# Patient Record
Sex: MALE | Race: WHITE | NOT HISPANIC OR LATINO | URBAN - METROPOLITAN AREA
[De-identification: names, ages, dates, MRNs, and addresses within clinical notes are randomized per-mention and may not be internally consistent; named-entity substitution may affect disease eponyms.]

---

## 2019-02-25 ENCOUNTER — OUTPATIENT (OUTPATIENT)
Dept: OUTPATIENT SERVICES | Facility: HOSPITAL | Age: 69
LOS: 1 days | End: 2019-02-25
Payer: MEDICARE

## 2019-02-25 VITALS
HEART RATE: 105 BPM | RESPIRATION RATE: 16 BRPM | TEMPERATURE: 98 F | OXYGEN SATURATION: 95 % | SYSTOLIC BLOOD PRESSURE: 145 MMHG | HEIGHT: 68 IN | WEIGHT: 227.08 LBS | DIASTOLIC BLOOD PRESSURE: 83 MMHG

## 2019-02-25 DIAGNOSIS — H26.9 UNSPECIFIED CATARACT: Chronic | ICD-10-CM

## 2019-02-25 DIAGNOSIS — S82.002A UNSPECIFIED FRACTURE OF LEFT PATELLA, INITIAL ENCOUNTER FOR CLOSED FRACTURE: ICD-10-CM

## 2019-02-25 DIAGNOSIS — Z01.818 ENCOUNTER FOR OTHER PREPROCEDURAL EXAMINATION: ICD-10-CM

## 2019-02-25 DIAGNOSIS — I10 ESSENTIAL (PRIMARY) HYPERTENSION: ICD-10-CM

## 2019-02-25 DIAGNOSIS — Z90.49 ACQUIRED ABSENCE OF OTHER SPECIFIED PARTS OF DIGESTIVE TRACT: Chronic | ICD-10-CM

## 2019-02-25 DIAGNOSIS — Z98.890 OTHER SPECIFIED POSTPROCEDURAL STATES: Chronic | ICD-10-CM

## 2019-02-25 DIAGNOSIS — Z96.649 PRESENCE OF UNSPECIFIED ARTIFICIAL HIP JOINT: Chronic | ICD-10-CM

## 2019-02-25 DIAGNOSIS — F41.9 ANXIETY DISORDER, UNSPECIFIED: ICD-10-CM

## 2019-02-25 LAB
ALBUMIN SERPL ELPH-MCNC: 3.9 G/DL — SIGNIFICANT CHANGE UP (ref 3.3–5)
ALP SERPL-CCNC: 103 U/L — SIGNIFICANT CHANGE UP (ref 40–120)
ALT FLD-CCNC: 38 U/L — SIGNIFICANT CHANGE UP (ref 12–78)
ANION GAP SERPL CALC-SCNC: 7 MMOL/L — SIGNIFICANT CHANGE UP (ref 5–17)
AST SERPL-CCNC: 19 U/L — SIGNIFICANT CHANGE UP (ref 15–37)
BILIRUB SERPL-MCNC: 0.5 MG/DL — SIGNIFICANT CHANGE UP (ref 0.2–1.2)
BUN SERPL-MCNC: 18 MG/DL — SIGNIFICANT CHANGE UP (ref 7–23)
CALCIUM SERPL-MCNC: 8.8 MG/DL — SIGNIFICANT CHANGE UP (ref 8.5–10.1)
CHLORIDE SERPL-SCNC: 104 MMOL/L — SIGNIFICANT CHANGE UP (ref 96–108)
CO2 SERPL-SCNC: 31 MMOL/L — SIGNIFICANT CHANGE UP (ref 22–31)
CREAT SERPL-MCNC: 0.91 MG/DL — SIGNIFICANT CHANGE UP (ref 0.5–1.3)
GLUCOSE SERPL-MCNC: 101 MG/DL — HIGH (ref 70–99)
HCT VFR BLD CALC: 41.9 % — SIGNIFICANT CHANGE UP (ref 39–50)
HGB BLD-MCNC: 13.3 G/DL — SIGNIFICANT CHANGE UP (ref 13–17)
MCHC RBC-ENTMCNC: 28.1 PG — SIGNIFICANT CHANGE UP (ref 27–34)
MCHC RBC-ENTMCNC: 31.7 GM/DL — LOW (ref 32–36)
MCV RBC AUTO: 88.4 FL — SIGNIFICANT CHANGE UP (ref 80–100)
NRBC # BLD: 0 /100 WBCS — SIGNIFICANT CHANGE UP (ref 0–0)
PLATELET # BLD AUTO: 179 K/UL — SIGNIFICANT CHANGE UP (ref 150–400)
POTASSIUM SERPL-MCNC: 4.3 MMOL/L — SIGNIFICANT CHANGE UP (ref 3.5–5.3)
POTASSIUM SERPL-SCNC: 4.3 MMOL/L — SIGNIFICANT CHANGE UP (ref 3.5–5.3)
PROT SERPL-MCNC: 7.6 G/DL — SIGNIFICANT CHANGE UP (ref 6–8.3)
RBC # BLD: 4.74 M/UL — SIGNIFICANT CHANGE UP (ref 4.2–5.8)
RBC # FLD: 14.3 % — SIGNIFICANT CHANGE UP (ref 10.3–14.5)
SODIUM SERPL-SCNC: 142 MMOL/L — SIGNIFICANT CHANGE UP (ref 135–145)
WBC # BLD: 8.87 K/UL — SIGNIFICANT CHANGE UP (ref 3.8–10.5)
WBC # FLD AUTO: 8.87 K/UL — SIGNIFICANT CHANGE UP (ref 3.8–10.5)

## 2019-02-25 PROCEDURE — 93010 ELECTROCARDIOGRAM REPORT: CPT | Mod: NC

## 2019-02-25 PROCEDURE — 93005 ELECTROCARDIOGRAM TRACING: CPT

## 2019-02-25 PROCEDURE — G0463: CPT

## 2019-02-25 PROCEDURE — 85027 COMPLETE CBC AUTOMATED: CPT

## 2019-02-25 PROCEDURE — 36415 COLL VENOUS BLD VENIPUNCTURE: CPT

## 2019-02-25 PROCEDURE — 80053 COMPREHEN METABOLIC PANEL: CPT

## 2019-02-25 NOTE — H&P PST ADULT - HISTORY OF PRESENT ILLNESS
58 yo male scheduled for Open Reduction Internal Fixation Left Patellar Fracture w/Fluoroscopy on 2/28/19 with Dr Roman.  Patient states he fell yesterday and injured his left knee.  Patient states he saw Dr Roman today and was told that he has a fracture patella.  Pain is currently is 5/10. Patient is ambulating with a walker

## 2019-02-25 NOTE — H&P PST ADULT - PMH
Anxiety    Depressed    Hyperlipidemia    Hypertension    Unspecified fracture of left patella, initial encounter for closed fracture

## 2019-02-25 NOTE — H&P PST ADULT - PSH
Cataract  bilateral  20 years ago  History of cholecystectomy  2007  History of cholecystectomy    History of hip replacement  2015  S/P ear surgery  stapes 1973

## 2019-02-25 NOTE — H&P PST ADULT - FAMILY HISTORY
Mother  Still living? No  Family history of breast cancer, Age at diagnosis: Age Unknown     Father  Still living? No  Family history of Parkinson disease, Age at diagnosis: Age Unknown

## 2019-02-25 NOTE — H&P PST ADULT - ASSESSMENT
60 yo male scheduled for Open Reduction Internal Fixation Left Patellar Fracture w/Fluoroscopy on 2/28/19 with Dr Roman.

## 2019-02-25 NOTE — H&P PST ADULT - NSANTHOSAYNRD_GEN_A_CORE
No. JHOAN screening performed.  STOP BANG Legend: 0-2 = LOW Risk; 3-4 = INTERMEDIATE Risk; 5-8 = HIGH Risk

## 2019-02-25 NOTE — H&P PST ADULT - PROBLEM SELECTOR PLAN 1
58 yo male scheduled for Open Reduction Internal Fixation Left Patellar Fracture w/Fluoroscopy on 2/28/19 with Dr Roman.  Check labs CBC CMP EKG  Medical Clearance  Hibiclens not given Advised to use Dial Antibacterial Soap  Stop Aleve today  Morning of surgery take Omeprazole Desvenlafaxine and Aripazole with a tiny sip of water  Patient verbalizes understanding of instructions

## 2019-02-27 ENCOUNTER — TRANSCRIPTION ENCOUNTER (OUTPATIENT)
Age: 69
End: 2019-02-27

## 2019-02-28 ENCOUNTER — OUTPATIENT (OUTPATIENT)
Dept: OUTPATIENT SERVICES | Facility: HOSPITAL | Age: 69
LOS: 1 days | End: 2019-02-28
Payer: MEDICARE

## 2019-02-28 VITALS
RESPIRATION RATE: 14 BRPM | SYSTOLIC BLOOD PRESSURE: 152 MMHG | DIASTOLIC BLOOD PRESSURE: 78 MMHG | OXYGEN SATURATION: 95 % | HEART RATE: 95 BPM

## 2019-02-28 VITALS
HEART RATE: 106 BPM | OXYGEN SATURATION: 97 % | SYSTOLIC BLOOD PRESSURE: 139 MMHG | TEMPERATURE: 100 F | RESPIRATION RATE: 16 BRPM | HEIGHT: 68 IN | WEIGHT: 229.94 LBS | DIASTOLIC BLOOD PRESSURE: 80 MMHG

## 2019-02-28 DIAGNOSIS — Z98.890 OTHER SPECIFIED POSTPROCEDURAL STATES: Chronic | ICD-10-CM

## 2019-02-28 DIAGNOSIS — Z90.49 ACQUIRED ABSENCE OF OTHER SPECIFIED PARTS OF DIGESTIVE TRACT: Chronic | ICD-10-CM

## 2019-02-28 DIAGNOSIS — S82.002A UNSPECIFIED FRACTURE OF LEFT PATELLA, INITIAL ENCOUNTER FOR CLOSED FRACTURE: ICD-10-CM

## 2019-02-28 DIAGNOSIS — H26.9 UNSPECIFIED CATARACT: Chronic | ICD-10-CM

## 2019-02-28 DIAGNOSIS — Z96.649 PRESENCE OF UNSPECIFIED ARTIFICIAL HIP JOINT: Chronic | ICD-10-CM

## 2019-02-28 PROCEDURE — C1713: CPT

## 2019-02-28 PROCEDURE — 76000 FLUOROSCOPY <1 HR PHYS/QHP: CPT

## 2019-02-28 PROCEDURE — 27524 TREAT KNEECAP FRACTURE: CPT | Mod: LT

## 2019-02-28 RX ORDER — TAMSULOSIN HYDROCHLORIDE 0.4 MG/1
0 CAPSULE ORAL
Qty: 30 | Refills: 0 | COMMUNITY

## 2019-02-28 RX ORDER — CEFAZOLIN SODIUM 1 G
2000 VIAL (EA) INJECTION ONCE
Qty: 0 | Refills: 0 | Status: COMPLETED | OUTPATIENT
Start: 2019-02-28 | End: 2019-02-28

## 2019-02-28 RX ORDER — DESVENLAFAXINE 50 MG/1
0 TABLET, EXTENDED RELEASE ORAL
Qty: 90 | Refills: 0 | COMMUNITY

## 2019-02-28 RX ORDER — OMEPRAZOLE 10 MG/1
0 CAPSULE, DELAYED RELEASE ORAL
Qty: 90 | Refills: 0 | COMMUNITY

## 2019-02-28 RX ORDER — OXYCODONE HYDROCHLORIDE 5 MG/1
5 TABLET ORAL ONCE
Qty: 0 | Refills: 0 | Status: DISCONTINUED | OUTPATIENT
Start: 2019-02-28 | End: 2019-02-28

## 2019-02-28 RX ORDER — ONDANSETRON 8 MG/1
4 TABLET, FILM COATED ORAL ONCE
Qty: 0 | Refills: 0 | Status: DISCONTINUED | OUTPATIENT
Start: 2019-02-28 | End: 2019-02-28

## 2019-02-28 RX ORDER — SODIUM CHLORIDE 9 MG/ML
1000 INJECTION, SOLUTION INTRAVENOUS
Qty: 0 | Refills: 0 | Status: DISCONTINUED | OUTPATIENT
Start: 2019-02-28 | End: 2019-02-28

## 2019-02-28 RX ORDER — SIMVASTATIN 20 MG/1
0 TABLET, FILM COATED ORAL
Qty: 90 | Refills: 0 | COMMUNITY

## 2019-02-28 RX ORDER — ASPIRIN/CALCIUM CARB/MAGNESIUM 324 MG
1 TABLET ORAL
Qty: 60 | Refills: 0 | OUTPATIENT
Start: 2019-02-28 | End: 2019-03-29

## 2019-02-28 RX ORDER — ARIPIPRAZOLE 15 MG/1
0 TABLET ORAL
Qty: 90 | Refills: 0 | COMMUNITY

## 2019-02-28 RX ORDER — HYDROMORPHONE HYDROCHLORIDE 2 MG/ML
0.5 INJECTION INTRAMUSCULAR; INTRAVENOUS; SUBCUTANEOUS
Qty: 0 | Refills: 0 | Status: DISCONTINUED | OUTPATIENT
Start: 2019-02-28 | End: 2019-02-28

## 2019-02-28 RX ORDER — ALPRAZOLAM 0.25 MG
0 TABLET ORAL
Qty: 30 | Refills: 0 | COMMUNITY

## 2019-02-28 RX ADMIN — SODIUM CHLORIDE 50 MILLILITER(S): 9 INJECTION, SOLUTION INTRAVENOUS at 10:13

## 2019-02-28 RX ADMIN — SODIUM CHLORIDE 75 MILLILITER(S): 9 INJECTION, SOLUTION INTRAVENOUS at 14:16

## 2019-02-28 NOTE — ASU DISCHARGE PLAN (ADULT/PEDIATRIC) - ASU DC SPECIAL INSTRUCTIONSFT
Follow up with Dr. Roman in 2 days, this Saturday. Call office for appointment. Take medications as prescribed. Keep dressing clean, dry, and intact. Rest, ice, and elevate affected extremity.   -Keep Left Lower extremity in Knee immobilizer with protected weight bearing as tolerated. Use crutches as assistive device.

## 2019-02-28 NOTE — ASU DISCHARGE PLAN (ADULT/PEDIATRIC) - NURSING INSTRUCTIONS
Discharge instructions to include medications and its indications for use completed. Activity restrictions discussed. Signs/symptoms requiring immediate medical attention discussed. Infection prevention measures discussed. Patient and spouse Viji verbalized understanding of all teachings  and assures compliance.

## 2019-02-28 NOTE — BRIEF OPERATIVE NOTE - PROCEDURE
<<-----Click on this checkbox to enter Procedure Open reduction and internal fixation (ORIF) of fracture  02/28/2019  Left patella fracture  Active  [email or web address]

## 2019-02-28 NOTE — ASU DISCHARGE PLAN (ADULT/PEDIATRIC) - CARE PROVIDER_API CALL
Eduardo Roman)  Orthopaedic Surgery Surgery  [address] [place] [address]  Phone: [phone]  Fax: [phone]  Follow Up Time:

## 2019-02-28 NOTE — ASU DISCHARGE PLAN (ADULT/PEDIATRIC) - FOLLOW UP APPOINTMENTS
911 or go to the nearest Emergency Room [place] [phone] or go to the nearest Emergency Room

## 2023-03-10 PROBLEM — F41.9 ANXIETY DISORDER, UNSPECIFIED: Chronic | Status: ACTIVE | Noted: 2019-02-25

## 2023-03-10 PROBLEM — S82.002A UNSPECIFIED FRACTURE OF LEFT PATELLA, INITIAL ENCOUNTER FOR CLOSED FRACTURE: Chronic | Status: ACTIVE | Noted: 2019-02-25

## 2023-03-10 PROBLEM — I10 ESSENTIAL (PRIMARY) HYPERTENSION: Chronic | Status: ACTIVE | Noted: 2019-02-25

## 2023-03-10 PROBLEM — E78.5 HYPERLIPIDEMIA, UNSPECIFIED: Chronic | Status: ACTIVE | Noted: 2019-02-25

## 2023-03-10 PROBLEM — F32.9 MAJOR DEPRESSIVE DISORDER, SINGLE EPISODE, UNSPECIFIED: Chronic | Status: ACTIVE | Noted: 2019-02-25

## 2023-03-14 ENCOUNTER — APPOINTMENT (OUTPATIENT)
Dept: ORTHOPEDIC SURGERY | Facility: CLINIC | Age: 73
End: 2023-03-14
Payer: MEDICARE

## 2023-03-14 VITALS — WEIGHT: 200 LBS | BODY MASS INDEX: 31.39 KG/M2 | HEIGHT: 67 IN

## 2023-03-14 DIAGNOSIS — I10 ESSENTIAL (PRIMARY) HYPERTENSION: ICD-10-CM

## 2023-03-14 DIAGNOSIS — E78.00 PURE HYPERCHOLESTEROLEMIA, UNSPECIFIED: ICD-10-CM

## 2023-03-14 PROBLEM — Z00.00 ENCOUNTER FOR PREVENTIVE HEALTH EXAMINATION: Status: ACTIVE | Noted: 2023-03-14

## 2023-03-14 PROCEDURE — 99213 OFFICE O/P EST LOW 20 MIN: CPT

## 2023-03-14 PROCEDURE — 72110 X-RAY EXAM L-2 SPINE 4/>VWS: CPT

## 2023-03-14 NOTE — PHYSICAL EXAM
[de-identified] : Patient returns the office with some increased left-sided lower back pain over the last several days.  He denies any specific injury or any radicular symptoms.  He is ambulating with a fairly normal gait.  He is locally tender in the left lumbar paraspinals toward the pelvis.  Right leg raising is negative.  Deep tendon reflexes are 1+ bilaterally.  Vascular status is grossly intact in both lower extremities.  Range of motion the left hip is minimally limited.  No local bursitis.

## 2023-03-14 NOTE — HISTORY OF PRESENT ILLNESS
[Lower back] : lower back [2] : 2 [Retired] : Work status: retired [de-identified] : 72yo male presenting for a follow up visit of the lower back.  [] : no [FreeTextEntry6] : stiffness

## 2023-03-14 NOTE — ASSESSMENT
[FreeTextEntry1] : Patient will attend some physical therapy and use Advil and Aleve as a as needed basis for his pain.  He needs to decrease his pain and increase his core strength and flexibility.  He will return in 3 weeks.

## 2023-03-14 NOTE — DATA REVIEWED
[FreeTextEntry1] : X-ray examination of the lumbar spine 2 views reveals severe osteoarthritic changes in the lumbar spine with scoliosis present he may even have 1 or 2 prior compression fractures.

## 2023-03-21 ENCOUNTER — APPOINTMENT (OUTPATIENT)
Dept: ORTHOPEDIC SURGERY | Facility: CLINIC | Age: 73
End: 2023-03-21
Payer: MEDICARE

## 2023-03-21 VITALS — HEIGHT: 67 IN | BODY MASS INDEX: 31.39 KG/M2 | WEIGHT: 200 LBS

## 2023-03-21 PROCEDURE — 73502 X-RAY EXAM HIP UNI 2-3 VIEWS: CPT

## 2023-03-21 PROCEDURE — 99213 OFFICE O/P EST LOW 20 MIN: CPT

## 2023-03-21 RX ORDER — CYCLOBENZAPRINE HYDROCHLORIDE 10 MG/1
10 TABLET, FILM COATED ORAL
Qty: 10 | Refills: 1 | Status: COMPLETED | COMMUNITY
Start: 2023-03-21

## 2023-03-21 NOTE — HISTORY OF PRESENT ILLNESS
[Lower back] : lower back [Retired] : Work status: retired [8] : 8 [de-identified] : 74yo male presenting for a follow up visit of the lower back. Reports he has been in more pain since last visit in his left hip and groin. Has taken Aleve with no relief, did not start PT yet.  [] : no [FreeTextEntry6] : stiffness [de-identified] : xray [de-identified] : PT, medication

## 2023-03-21 NOTE — ASSESSMENT
[FreeTextEntry1] : Patient will attend some physical therapy and use Flexeril at night to help his symptoms since that is affecting him especially at night.  Hopefully the therapy can be helpful in increasing his range of motion the left hip which was somewhat restricted at this point.  He will return in approximately 3 weeks.

## 2023-03-21 NOTE — DATA REVIEWED
[FreeTextEntry1] : X-ray examination of the left hip/pelvis 2 views reveals some mild degenerative changes in the left hip.

## 2023-03-21 NOTE — PHYSICAL EXAM
[de-identified] : Patient returns the office with continued left lower back pain.  He reports that he is having increasing pain in left hip over the last several days without any specific injury.  He is ambulating with a mild antalgic gait affecting the left lower extremity his hip motion is restricted to internal rotation and full flexion.  He has no localized tenderness at the hip bursa.  Neurovascular is grossly intact in the left lower extremity.

## 2023-04-14 ENCOUNTER — APPOINTMENT (OUTPATIENT)
Dept: ORTHOPEDIC SURGERY | Facility: CLINIC | Age: 73
End: 2023-04-14
Payer: MEDICARE

## 2023-04-14 VITALS — HEIGHT: 67 IN | WEIGHT: 200 LBS | BODY MASS INDEX: 31.39 KG/M2

## 2023-04-14 PROCEDURE — 99213 OFFICE O/P EST LOW 20 MIN: CPT

## 2023-04-14 RX ORDER — DICLOFENAC POTASSIUM 50 MG/1
50 TABLET, COATED ORAL TWICE DAILY
Qty: 10 | Refills: 1 | Status: ACTIVE | COMMUNITY
Start: 2023-04-14 | End: 1900-01-01

## 2023-04-14 NOTE — PHYSICAL EXAM
[de-identified] : Patient comes the office today with some increasing pain in the left lower back region over the last several weeks without any specific injury.  He denies any radicular symptoms down either leg.  With a mild antalgic gait due to the lower back pain on the left side is locally tender towards the left sacroiliac joint.  Is also noted to have some limited internal rotation of the left hip but no localized bursitis in the hip.

## 2023-04-14 NOTE — HISTORY OF PRESENT ILLNESS
[Lower back] : lower back [8] : 8 [Nothing helps with pain getting better] : Nothing helps with pain getting better [Retired] : Work status: retired [Dull/Aching] : dull/aching [] : no [FreeTextEntry5] : 74 y/o M presents for f/u eval. of lower back. Pt reports pain levels remain the same since last visit. Pt notes he tried Flexeril with no relief.  [FreeTextEntry7] : Lt. Hip & groin [de-identified] : 3/21/2023 [de-identified] : Dr. Roman

## 2023-04-14 NOTE — ASSESSMENT
[FreeTextEntry1] : Patient will work with the physical therapist to try and increase his hip motion and decrease his lower back pain.  He will take Cataflam for a few days to decrease his current pain and he will be rechecked again in 2 to 3 weeks.

## 2023-05-12 ENCOUNTER — APPOINTMENT (OUTPATIENT)
Dept: ORTHOPEDIC SURGERY | Facility: CLINIC | Age: 73
End: 2023-05-12
Payer: MEDICARE

## 2023-05-12 VITALS — HEIGHT: 67 IN | WEIGHT: 200 LBS | BODY MASS INDEX: 31.39 KG/M2

## 2023-05-12 DIAGNOSIS — M16.12 UNILATERAL PRIMARY OSTEOARTHRITIS, LEFT HIP: ICD-10-CM

## 2023-05-12 PROCEDURE — 99213 OFFICE O/P EST LOW 20 MIN: CPT

## 2023-05-12 NOTE — ASSESSMENT
[FreeTextEntry1] : Patient will continue to work with the therapist on his lower back and hip pain.  He will try to increase range of motion in the left hip area and normalize his gait which is mildly antalgic at this point time he will take the Cataflam medication twice a day which seems to give him some relief from his pain follow-up in 4 weeks.

## 2023-05-12 NOTE — HISTORY OF PRESENT ILLNESS
[8] : 8 [4] : 4 [Dull/Aching] : dull/aching [Constant] : constant [Meds] : meds [Sitting] : sitting [Standing] : standing [Lying in bed] : lying in bed [Stairs] : stairs [] : no [FreeTextEntry1] : Lower back [FreeTextEntry5] : 74 y/o M presents for f/u eval. of lower back. Pt reports pain levels have improved since last visit. Pt reports of increased pain levels and stiffness when standing or sitting for long periods of time. Pt was prescribed Diclofenac however Aleve provides him more relief. He notes stiffness has caused occasional limping when walking. [FreeTextEntry7] : Lt. Saein [de-identified] : 4/14/2023 [de-identified] : Dr. Roman

## 2023-05-12 NOTE — PHYSICAL EXAM
[de-identified] : Patient returns to the office today with some increased pain in the left hip and lower back region.  He has had his right hip replaced about 7 years ago and has come concerned about the left hip at this point time is noted to have some mild decreased internal rotation and flexion of the left hip.  No localized tenderness at the trochanteric bursa of the left hip.  He has some tenderness in the lumbar paraspinals toward the SI joint.  Leg raising is negative.  DTRs are 1+ bilaterally.  Vascular status is grossly intact in both lower extremities.

## 2023-06-04 ENCOUNTER — TRANSCRIPTION ENCOUNTER (OUTPATIENT)
Age: 73
End: 2023-06-04

## 2023-06-05 RX ORDER — DICLOFENAC POTASSIUM 50 MG/1
50 TABLET, COATED ORAL TWICE DAILY
Qty: 20 | Refills: 2 | Status: ACTIVE | COMMUNITY
Start: 2023-06-05 | End: 1900-01-01

## 2023-06-13 ENCOUNTER — APPOINTMENT (OUTPATIENT)
Dept: ORTHOPEDIC SURGERY | Facility: CLINIC | Age: 73
End: 2023-06-13
Payer: MEDICARE

## 2023-06-13 VITALS — WEIGHT: 200 LBS | HEIGHT: 67 IN | BODY MASS INDEX: 31.39 KG/M2

## 2023-06-13 PROCEDURE — 73030 X-RAY EXAM OF SHOULDER: CPT | Mod: RT

## 2023-06-13 PROCEDURE — 99213 OFFICE O/P EST LOW 20 MIN: CPT

## 2023-06-13 NOTE — PHYSICAL EXAM
[de-identified] : Patient returns to the office today with some increasing symptoms regarding his right shoulder.  He describes that he has had some chronic problems with the right shoulder affecting his motion and also may be affecting his neck.  Any recent injury to the right shoulder.  Nation of the right shoulder reveals no significant swelling.  Is mildly tenderness at the proximal biceps tendon.  His range of motion is quite limited especially to abduction and external rotation.  He has no gross instability.  Strength is 4 out of 5.  Neurovascular status grossly intact in the right upper extremity.  Spine motion is significantly limited to extension and more so to the right rotation with some tenderness in the right trapezius and lower right paraspinals in the C-spine.

## 2023-06-13 NOTE — DATA REVIEWED
[FreeTextEntry1] : 2 x-rays of the right shoulder are performed today revealing significant AC joint degenerative changes.

## 2023-06-13 NOTE — HISTORY OF PRESENT ILLNESS
[Lower back] : lower back [8] : 8 [4] : 4 [Dull/Aching] : dull/aching [Constant] : constant [Meds] : meds [Sitting] : sitting [Standing] : standing [Stairs] : stairs [Lying in bed] : lying in bed [] : yes [FreeTextEntry5] : continuing PT, taking Cataflam. reports neck/bilateral shoulder pain. Fell off of his bed 2-3 weeks ago causing stiff tight pain since.  [FreeTextEntry7] : left groin

## 2023-06-23 ENCOUNTER — APPOINTMENT (OUTPATIENT)
Dept: ORTHOPEDIC SURGERY | Facility: CLINIC | Age: 73
End: 2023-06-23

## 2023-07-11 ENCOUNTER — APPOINTMENT (OUTPATIENT)
Dept: ORTHOPEDIC SURGERY | Facility: CLINIC | Age: 73
End: 2023-07-11
Payer: MEDICARE

## 2023-07-11 VITALS — HEIGHT: 67 IN | BODY MASS INDEX: 31.39 KG/M2 | WEIGHT: 200 LBS

## 2023-07-11 DIAGNOSIS — M75.01 ADHESIVE CAPSULITIS OF RIGHT SHOULDER: ICD-10-CM

## 2023-07-11 PROCEDURE — 99213 OFFICE O/P EST LOW 20 MIN: CPT

## 2023-07-11 PROCEDURE — 73562 X-RAY EXAM OF KNEE 3: CPT | Mod: 50

## 2023-07-11 NOTE — ASSESSMENT
[FreeTextEntry1] : Patient will continue to work on his therapy on the right shoulder to try and increase his motion in that region we will try and work on some quad exercises to increase the strength in the lower extremities and allow him to ambulate better.  Return in 3 to 4 weeks.

## 2023-07-11 NOTE — PHYSICAL EXAM
[de-identified] : Patient returns to the office in follow-up of his right shoulder with increasing complaints in both knees also examination today of the right shoulder reveals significant limitation to external rotation and abduction he has mild tenderness at the biceps tendon but no gross instability and no significant swelling in the right shoulder examination of both of his knees revealed minimal swelling with range of motion 0 to 115 degrees she has crepitus on range of motion especially under the patellofemoral joint.  Quad strength is 4 out of 5 in both lower extremities and his ambulation is slow but fairly normal.

## 2023-07-11 NOTE — DATA REVIEWED
[FreeTextEntry1] : Three-view x-rays of each knee are performed revealing significant medial joint space narrowing on both sides.  He is also noted to have good good fixation from his screws from his prior left patella fracture.

## 2023-07-11 NOTE — HISTORY OF PRESENT ILLNESS
[Lower back] : lower back [4] : 4 [Dull/Aching] : dull/aching [Constant] : constant [Meds] : meds [Sitting] : sitting [Standing] : standing [Stairs] : stairs [Lying in bed] : lying in bed [] : yes [7] : 7 [FreeTextEntry5] : continuing PT, c/o lower back stiffness and bilateral knee pain as of today.  [FreeTextEntry7] : left groin

## 2023-11-28 ENCOUNTER — APPOINTMENT (OUTPATIENT)
Dept: ORTHOPEDIC SURGERY | Facility: CLINIC | Age: 73
End: 2023-11-28
Payer: MEDICARE

## 2023-11-28 PROCEDURE — 20610 DRAIN/INJ JOINT/BURSA W/O US: CPT | Mod: LT

## 2023-11-28 PROCEDURE — 99213 OFFICE O/P EST LOW 20 MIN: CPT | Mod: 25

## 2023-11-28 RX ORDER — ATORVASTATIN CALCIUM 80 MG/1
TABLET, FILM COATED ORAL
Refills: 0 | Status: ACTIVE | COMMUNITY

## 2023-11-28 RX ORDER — LISINOPRIL 30 MG/1
TABLET ORAL
Refills: 0 | Status: ACTIVE | COMMUNITY

## 2023-11-28 RX ORDER — LEVOMEFOLATE CALCIUM 7.5 MG
7.5 TABLET ORAL
Refills: 0 | Status: ACTIVE | COMMUNITY

## 2023-11-28 RX ORDER — MULTIVIT-MIN/IRON/FOLIC ACID/K 18-600-40
400 CAPSULE ORAL
Refills: 0 | Status: ACTIVE | COMMUNITY

## 2023-11-28 RX ORDER — NEBIVOLOL 20 MG/1
TABLET ORAL
Refills: 0 | Status: ACTIVE | COMMUNITY

## 2023-11-28 RX ORDER — OMEPRAZOLE 10 MG/1
10 CAPSULE, DELAYED RELEASE ORAL
Refills: 0 | Status: ACTIVE | COMMUNITY

## 2023-11-28 RX ORDER — ALPRAZOLAM 3 MG/1
TABLET, EXTENDED RELEASE ORAL
Refills: 0 | Status: ACTIVE | COMMUNITY

## 2023-11-28 RX ORDER — GINGER ROOT/GINGER ROOT EXT 262.5 MG
CAPSULE ORAL
Refills: 0 | Status: ACTIVE | COMMUNITY

## 2023-11-28 RX ORDER — L-MEFOL/A-CYST/MEB12/ALGAL OIL 6-600-2 MG
TABLET ORAL
Refills: 0 | Status: ACTIVE | COMMUNITY

## 2023-12-19 ENCOUNTER — APPOINTMENT (OUTPATIENT)
Dept: ORTHOPEDIC SURGERY | Facility: CLINIC | Age: 73
End: 2023-12-19

## 2024-02-19 ENCOUNTER — APPOINTMENT (OUTPATIENT)
Dept: ORTHOPEDIC SURGERY | Facility: CLINIC | Age: 74
End: 2024-02-19
Payer: MEDICARE

## 2024-02-19 VITALS — HEIGHT: 67 IN | BODY MASS INDEX: 31.39 KG/M2 | WEIGHT: 200 LBS

## 2024-02-19 PROCEDURE — 99213 OFFICE O/P EST LOW 20 MIN: CPT

## 2024-02-19 NOTE — PROCEDURE
[FreeTextEntry3] : After cleaning the left area left knee area with alcohol a prefilled syringe of Orthovisc was injected into his left knee patient tolerated the procedure well and had minimal discomfort after the injection.  He will return in 1 week for the neck shot.

## 2024-02-20 NOTE — DATA REVIEWED
[FreeTextEntry1] : 3 x-rays of the lumbar spine performed today are consistent with some mild scoliosis and significant degenerative changes from L3-S1

## 2024-02-20 NOTE — REASON FOR VISIT
[FreeTextEntry2] : follow up- lower back Patient returns to the office in follow-up regarding left-sided lower back pain and left knee pain.  He reports some increased lower back pain since he has been helping his wife at home after her recent surgery.  Please ambulate with a slight antalgic gait he is walking somewhat flexed over from the lumbar spine straight leg raising is negative bilaterally DTRs are 1+ bilateral neurovascular status grossly intact both lower extremities he can take a few steps on both his toes and heels he can flex approximately 3 to 4 inches from his toes he has some tenderness localized in the lumbar paraspinals more so on the left side than the right he has good range of motion in the left hip with no localized bursitis

## 2024-02-20 NOTE — ASSESSMENT
[FreeTextEntry1] : Patient will take the Cataflam medication as necessary which gives him some relief he will also attend some physical therapy for 2 or 3 weeks to try and calm down his symptoms and work on exercises on his own he should return in approximately 3 weeks for follow-up.

## 2024-02-20 NOTE — HISTORY OF PRESENT ILLNESS
[Lower back] : lower back [7] : 7 [4] : 4 [Dull/Aching] : dull/aching [Constant] : constant [Meds] : meds [Standing] : standing [Walking] : walking [Retired] : Work status: retired [] : Post Surgical Visit: no [FreeTextEntry5] : continuing PT, c/o lower back stiffness previous R hip replacement [FreeTextEntry6] : stiffness [de-identified] : 11/28/2023 [de-identified] : Dr. Roman  [de-identified] : 2/5/2024

## 2024-02-22 ENCOUNTER — APPOINTMENT (OUTPATIENT)
Dept: MRI IMAGING | Facility: CLINIC | Age: 74
End: 2024-02-22

## 2024-02-27 ENCOUNTER — APPOINTMENT (OUTPATIENT)
Dept: ORTHOPEDIC SURGERY | Facility: CLINIC | Age: 74
End: 2024-02-27

## 2024-03-20 ENCOUNTER — NON-APPOINTMENT (OUTPATIENT)
Age: 74
End: 2024-03-20

## 2024-03-22 ENCOUNTER — APPOINTMENT (OUTPATIENT)
Dept: ORTHOPEDIC SURGERY | Facility: CLINIC | Age: 74
End: 2024-03-22
Payer: MEDICARE

## 2024-03-22 VITALS — BODY MASS INDEX: 31.39 KG/M2 | WEIGHT: 200 LBS | HEIGHT: 67 IN

## 2024-03-22 DIAGNOSIS — M17.10 UNILATERAL PRIMARY OSTEOARTHRITIS, UNSPECIFIED KNEE: ICD-10-CM

## 2024-03-22 PROCEDURE — 99213 OFFICE O/P EST LOW 20 MIN: CPT

## 2024-03-22 NOTE — ASSESSMENT
[FreeTextEntry1] : Patient will t continue with the Cataflam medication was given with some relief.  He will also attend some physical therapy on his lower back to try and decrease the pain and have him ambulate with a better gait I believe because the arthritic symptoms in both knees and significant medial joint degenerative changes he will he will benefit from a series of Orthovisc shots on both knees preferably on the right side starting next visit he return in a few weeks and we will start the Orthovisc shots on the right knee at that point and probably used through the left side a month or 2 later.

## 2024-03-22 NOTE — HISTORY OF PRESENT ILLNESS
[Lower back] : lower back [7] : 7 [4] : 4 [Dull/Aching] : dull/aching [Constant] : constant [Meds] : meds [Standing] : standing [Walking] : walking [Retired] : Work status: retired [] : Post Surgical Visit: no [FreeTextEntry6] : stiffness [de-identified] : 2/19/2024 [FreeTextEntry5] : continuing PT, c/o lower back stiffness previous R hip replacement [de-identified] : Dr. Roman  [de-identified] : 2/5/2024 [de-identified] : MRI

## 2024-03-22 NOTE — REASON FOR VISIT
[FreeTextEntry2] : follow up- lower back MRI review Patient returns to the office in follow-up regarding left-sided lower back pain and left knee pain.  He reports some increased lower back pain  since he has been helping his wife at home after her recent surgery.  Patient is ambulating with a lumbar flexed gait and he has some tenderness in the lumbar paraspinals bilaterally he denies any radicular symptoms down his legs and straight leg raising is negative DTRs are 1+ bilaterally motor sensation grossly normal in the lower extremities both knees are noted to have mild swelling with range of motion 0-1 28 and patellofemoral and crepitus on range of motion he has no gross instability in either knee Recent MRI scan of the lumbar spine performed revealing several disc herniations in the lumbar region with significant degenerative changes.

## 2024-04-12 ENCOUNTER — APPOINTMENT (OUTPATIENT)
Dept: ORTHOPEDIC SURGERY | Facility: CLINIC | Age: 74
End: 2024-04-12
Payer: MEDICARE

## 2024-04-12 VITALS — WEIGHT: 200 LBS | BODY MASS INDEX: 31.39 KG/M2 | HEIGHT: 67 IN

## 2024-04-12 DIAGNOSIS — M47.816 SPONDYLOSIS W/OUT MYELOPATHY OR RADICULOPATHY, LUMBAR REGION: ICD-10-CM

## 2024-04-12 PROCEDURE — 72100 X-RAY EXAM L-S SPINE 2/3 VWS: CPT

## 2024-04-12 PROCEDURE — 99213 OFFICE O/P EST LOW 20 MIN: CPT

## 2024-04-12 NOTE — HISTORY OF PRESENT ILLNESS
[Lower back] : lower back [7] : 7 [4] : 4 [Dull/Aching] : dull/aching [Constant] : constant [Meds] : meds [Standing] : standing [Walking] : walking [Retired] : Work status: retired [] : Post Surgical Visit: no [FreeTextEntry5] : continuing PT, c/o lower back stiffness previous R hip replacement [FreeTextEntry6] : stiffness [de-identified] : 3/22/2024 [de-identified] : Dr. Roman  [de-identified] : 2/5/2024 [de-identified] : MRI

## 2024-04-12 NOTE — ASSESSMENT
[FreeTextEntry1] : Patient will t continue to work with the physical therapist to try and alleviate his lower back pain and increase his core strength and his flexibility which is poor he will take the anti-inflammatory medicine on apparent basis and return in 3 to 4 weeks for

## 2024-04-12 NOTE — REASON FOR VISIT
[FreeTextEntry2] : follow up- lower back Patient returns to the office in follow-up regarding left-sided lower back pain and left knee pain.  since he has been helping his wife at home after her recent surgery.  Patient is ambulating with a lumbar flexed gait and he has some tenderness in the lumbar paraspinals bilaterally he denies any radicular symptoms down his legs and straight leg raising is negative DTRs are 1+ bilaterally motor sensation grossly normal in the lower extremities both knees are noted to have mild swelling with range of motion 0-1 28 and patellofemoral and crepitus on range of motion he has no gross instability in either knee Recent MRI scan of the lumbar spine performed revealing several disc herniations in the lumbar region with significant degenerative changes. He reports no radicular symptoms at this point in time but does have some lower back pain causing him to sometimes ambulate with a flexed gait he can take a few steps on his toes and heels and he can flex to about 3 to 4 inches from his toes

## 2024-04-23 RX ORDER — DICLOFENAC POTASSIUM 50 MG/1
50 TABLET, COATED ORAL
Qty: 42 | Refills: 0 | Status: ACTIVE | COMMUNITY
Start: 2023-11-28 | End: 1900-01-01

## 2024-05-10 ENCOUNTER — APPOINTMENT (OUTPATIENT)
Dept: ORTHOPEDIC SURGERY | Facility: CLINIC | Age: 74
End: 2024-05-10